# Patient Record
Sex: MALE | Race: WHITE | NOT HISPANIC OR LATINO | Employment: FULL TIME | URBAN - METROPOLITAN AREA
[De-identification: names, ages, dates, MRNs, and addresses within clinical notes are randomized per-mention and may not be internally consistent; named-entity substitution may affect disease eponyms.]

---

## 2017-08-19 ENCOUNTER — OFFICE VISIT (OUTPATIENT)
Dept: URGENT CARE | Age: 32
End: 2017-08-19
Payer: COMMERCIAL

## 2017-08-19 PROCEDURE — G0382 LEV 3 HOSP TYPE B ED VISIT: HCPCS

## 2017-08-19 PROCEDURE — S9083 URGENT CARE CENTER GLOBAL: HCPCS

## 2018-06-03 ENCOUNTER — HOSPITAL ENCOUNTER (EMERGENCY)
Facility: HOSPITAL | Age: 33
Discharge: HOME | End: 2018-06-03
Attending: EMERGENCY MEDICINE | Admitting: EMERGENCY MEDICINE
Payer: COMMERCIAL

## 2018-06-03 VITALS
DIASTOLIC BLOOD PRESSURE: 69 MMHG | SYSTOLIC BLOOD PRESSURE: 116 MMHG | TEMPERATURE: 98.3 F | OXYGEN SATURATION: 99 % | RESPIRATION RATE: 18 BRPM | HEART RATE: 66 BPM | WEIGHT: 161.82 LBS

## 2018-06-03 DIAGNOSIS — S61.219A FINGER LACERATION: Primary | ICD-10-CM

## 2018-06-03 PROCEDURE — 90715 TDAP VACCINE 7 YRS/> IM: CPT | Performed by: EMERGENCY MEDICINE

## 2018-06-03 PROCEDURE — 99282 EMERGENCY DEPT VISIT SF MDM: CPT

## 2018-06-03 PROCEDURE — 90471 IMMUNIZATION ADMIN: CPT

## 2018-06-03 RX ORDER — AZITHROMYCIN 250 MG/1
500 TABLET, FILM COATED ORAL EVERY 24 HOURS
COMMUNITY
End: 2022-03-14

## 2018-06-03 RX ORDER — OMEPRAZOLE 10 MG/1
10 CAPSULE, DELAYED RELEASE ORAL DAILY
COMMUNITY

## 2018-06-03 RX ADMIN — TETANUS TOXOID, REDUCED DIPHTHERIA TOXOID AND ACELLULAR PERTUSSIS VACCINE, ADSORBED 0.5 ML: 5; 2.5; 8; 8; 2.5 SUSPENSION INTRAMUSCULAR at 22:26

## 2018-06-04 NOTE — ED PROVIDER NOTES
History  Chief Complaint   Patient presents with    Finger Laceration     Pt presents to ED for evaluation and treatment of laceration to left second digit  States he cut his finger with a knife when cutting a baguette       History provided by:  Patient   used: No     57-year-old male presented with acute laceration of the volar aspect of his left index finger, about 8 mm long while cutting bread  Pain minimal, localized  Minimal venous oozing on arrival   Last tetanus unknown  Mild tenderness  Able to range the finger normally  No tendon involvement  Plan irrigation and closure with glue  Prior to Admission Medications   Prescriptions Last Dose Informant Patient Reported? Taking? azithromycin (ZITHROMAX) 250 mg tablet   Yes Yes   Sig: Take 500 mg by mouth every 24 hours   omeprazole (PriLOSEC) 10 mg delayed release capsule   Yes Yes   Sig: Take 10 mg by mouth daily      Facility-Administered Medications: None       Past Medical History:   Diagnosis Date    GERD (gastroesophageal reflux disease)        No past surgical history on file  No family history on file  I have reviewed and agree with the history as documented  Social History   Substance Use Topics    Smoking status: Never Smoker    Smokeless tobacco: Not on file    Alcohol use No        Review of Systems   Constitutional: Negative for activity change and appetite change  Skin: Positive for wound  Neurological: Negative for dizziness, weakness and numbness  All other systems reviewed and are negative  Physical Exam  Physical Exam   Constitutional: He is oriented to person, place, and time  He appears well-developed and well-nourished  No distress  HENT:   Head: Normocephalic and atraumatic  Neck: Normal range of motion  Neck supple  Cardiovascular: Normal rate and regular rhythm  Pulmonary/Chest: Effort normal  No respiratory distress  Musculoskeletal: Normal range of motion   He exhibits no edema  Neurological: He is alert and oriented to person, place, and time  He exhibits normal muscle tone  Skin: Skin is warm and dry  8 mm laceration on the distal volar aspect of the left index finger  Psychiatric: He has a normal mood and affect  His behavior is normal    Nursing note and vitals reviewed  Vital Signs  ED Triage Vitals [06/03/18 2045]   Temperature Pulse Respirations Blood Pressure SpO2   98 3 °F (36 8 °C) 66 18 116/69 99 %      Temp Source Heart Rate Source Patient Position - Orthostatic VS BP Location FiO2 (%)   Oral Monitor Sitting Right arm --      Pain Score       2           Vitals:    06/03/18 2045   BP: 116/69   Pulse: 66   Patient Position - Orthostatic VS: Sitting       Visual Acuity      ED Medications  Medications   tetanus-diphtheria-acellular pertussis (BOOSTRIX) IM injection 0 5 mL (0 5 mL Intramuscular Given 6/3/18 2226)       Diagnostic Studies  Results Reviewed     None                 No orders to display              Procedures  Lac Repair  Date/Time: 6/3/2018 10:29 PM  Performed by: Matt Sesay  Authorized by: Matt Sesay   Consent: Verbal consent obtained  Consent given by: patient  Patient identity confirmed: verbally with patient  Body area: upper extremity  Location details: left index finger  Laceration length: 0 8 cm    Wound Dehiscence:  Superficial Wound Dehiscence: simple closure      Procedure Details:  Irrigation solution: tap water  Skin closure: glue  Approximation: close  Approximation difficulty: simple  Patient tolerance: Patient tolerated the procedure well with no immediate complications             Phone Contacts  ED Phone Contact    ED Course                               MDM  Number of Diagnoses or Management Options  Finger laceration:   Diagnosis management comments: 77-year-old male presented with laceration on his left index finger while cutting bread    Laceration fairly superficial   Irrigated and closed with glue     Patient Progress  Patient progress: improved    CritCare Time    Disposition  Final diagnoses:   Finger laceration     Time reflects when diagnosis was documented in both MDM as applicable and the Disposition within this note     Time User Action Codes Description Comment    6/3/2018 10:23 PM Patricia Sims Add [I92 [id number]] Finger laceration       ED Disposition     ED Disposition Condition Comment    Discharge  Cuco Swartz discharge to home/self care  Condition at discharge: Good        Follow-up Information     Follow up With Specialties Details Why Contact [facility], DO   As needed [address] [place] [address]  [phone]            Discharge Medication List as of 6/3/2018 10:30 PM      CONTINUE these medications which have NOT CHANGED    Details   azithromycin (ZITHROMAX) 250 mg tablet Take 500 mg by mouth every 24 hours, Historical Med      omeprazole (PriLOSEC) 10 mg delayed release capsule Take 10 mg by mouth daily, Historical Med           No discharge procedures on file      ED Provider  Electronically Signed by           Garry Bergman MD  06/04/18 8544

## 2018-06-04 NOTE — DISCHARGE INSTRUCTIONS
Finger Laceration   WHAT YOU NEED TO KNOW:   A finger laceration is a deep cut in your skin  It is often caused by a sharp object, such as a knife, or blunt force to your finger  Your blood vessels, bones, joints, tendons, or nerves may also be injured  DISCHARGE INSTRUCTIONS:   Return to the emergency department if:   · Your wound comes apart  · Blood soaks through your bandage  · You have severe pain in your finger or hand  · Your finger is pale and cold  · You have sudden trouble moving your finger  · Your swelling suddenly gets worse  · You have red streaks on your skin coming from your wound  Contact your healthcare provider or hand specialist if:   · You have new numbness or tingling  · Your finger feels warm, looks swollen or red, and is draining pus  · You have a fever  · You have questions or concerns about your condition or care  Medicines: You may  need any of the following:  · Antibiotics  help prevent a bacterial infection  · Acetaminophen  decreases pain and fever  It is available without a doctor's order  Ask how much to take and how often to take it  Follow directions  Read the labels of all other medicines you are using to see if they also contain acetaminophen, or ask your doctor or pharmacist  Acetaminophen can cause liver damage if not taken correctly  Do not use more than 4 grams (4,000 milligrams) total of acetaminophen in one day  · Prescription pain medicine  may be given  Ask your healthcare provider how to take this medicine safely  Some prescription pain medicines contain acetaminophen  Do not take other medicines that contain acetaminophen without talking to your healthcare provider  Too much acetaminophen may cause liver damage  Prescription pain medicine may cause constipation  Ask your healthcare provider how to prevent or treat constipation  · Take your medicine as directed    Contact your healthcare provider if you think your medicine is not helping or if you have side effects  Tell him or her if you are allergic to any medicine  Keep a list of the medicines, vitamins, and herbs you take  Include the amounts, and when and why you take them  Bring the list or the pill bottles to follow-up visits  Carry your medicine list with you in case of an emergency  Self-care:   · Apply ice  on your finger for 15 to 20 minutes every hour or as directed  Use an ice pack, or put crushed ice in a plastic bag  Cover it with a towel before you apply it to your skin  Ice helps prevent tissue damage and decreases swelling and pain  · Elevate  your hand above the level of your heart as often as you can  This will help decrease swelling and pain  Prop your hand on pillows or blankets to keep it elevated comfortably  · Wear your splint as directed  A splint will decrease movement and stress on your wound  The splint may help your wound heal faster  Ask your healthcare provider how to apply and remove a splint  · Apply ointments to decrease scarring  Do not apply ointments until your healthcare provider says it is okay  You may need to wait until your wound is healed  Ask which ointment to buy and how often to use it  Wound care:   · Do not get your wound wet until your healthcare provider says it is okay  Do not soak your hand in water  Do not go swimming until your healthcare provider says it is okay  When your healthcare provider says it is okay, carefully wash around the wound with soap and water  Let soap and water run over your wound  Gently pat the area dry or allow it to air dry  · Change your bandages when they get wet, dirty, or after washing  Apply new, clean bandages as directed  Do not apply elastic bandages or tape too tightly  Do not put powders or lotions on your wound  · Apply antibiotic ointment as directed  Your healthcare provider may give you antibiotic ointment to put over your wound if you have stitches   If you have Strips-Strips over your wound, let them dry up and fall off on their own  If they do not fall off within 14 days, gently remove them  If you have glue over your wound, do not remove or pick at it  If your glue comes off, do not replace it with glue that you have at home  · Check your wound every day for signs of infection  Signs of infection include swelling, redness, or pus  Follow up with your healthcare provider or hand specialist in 2 days:  Write down your questions so you remember to ask them during your visits  © 2017 [employer] Information is for End User's use only and may not be sold, redistributed or otherwise used for commercial purposes  All illustrations and images included in CareNotes® are the copyrighted property of A [employer]  or [employer]  The above information is an  only  It is not intended as medical advice for individual conditions or treatments  Talk to your doctor, nurse or pharmacist before following any medical regimen to see if it is safe and effective for you  Skin Adhesive Care   WHAT YOU NEED TO KNOW:   Skin adhesive is medical glue used to close wounds  It is a substitute for staples and stitches  Skin adhesive wound closures take less time and do not require anesthesia  You have less pain and a lower risk of infection than with staples or stitches  Skin adhesive will fall off after the wound is healed  DISCHARGE INSTRUCTIONS:   Self-care:   · Keep your wound clean and dry  for 1 to 5 days  You can shower 24 hours after the skin adhesive is applied  Lightly pat your wound dry after you shower  · Do not soak  your wound in water, such as in a bath or hot tub  · Do not scrub  your wound or pick at the adhesive  This can make your wound reopen  · Do not apply ointments  to your wound  These include antibiotic and other ointments that contain petroleum jelly   These products will remove skin adhesive and reopen your wound  Follow up with your healthcare provider as directed:  Write down your questions so you remember to ask them during your visits  Contact your healthcare provider if:   · You have a fever  · Your wound is red and warm to touch  · You have questions or concerns about your condition or care  Return to the emergency department if:   · Your wound has fluid draining from it  · Your wound opens  © 2017 [address] Information is for End User's use only and may not be sold, redistributed or otherwise used for commercial purposes  All illustrations and images included in CareNotes® are the copyrighted property of [employer]  or [employer]  The above information is an  only  It is not intended as medical advice for individual conditions or treatments  Talk to your doctor, nurse or pharmacist before following any medical regimen to see if it is safe and effective for you

## 2019-02-07 ENCOUNTER — TRANSCRIBE ORDERS (OUTPATIENT)
Dept: ADMINISTRATIVE | Age: 34
End: 2019-02-07

## 2019-02-07 ENCOUNTER — APPOINTMENT (OUTPATIENT)
Dept: RADIOLOGY | Age: 34
End: 2019-02-07
Payer: COMMERCIAL

## 2019-02-07 DIAGNOSIS — M79.671 RIGHT FOOT PAIN: Primary | ICD-10-CM

## 2019-02-07 DIAGNOSIS — M79.671 RIGHT FOOT PAIN: ICD-10-CM

## 2019-02-07 PROCEDURE — 73630 X-RAY EXAM OF FOOT: CPT

## 2019-02-22 PROBLEM — J34.2 NASAL SEPTAL DEVIATION: Status: ACTIVE | Noted: 2019-02-22

## 2019-02-22 PROBLEM — H91.90 HEARING LOSS: Status: ACTIVE | Noted: 2019-02-22

## 2019-02-22 PROBLEM — H93.11 TINNITUS, RIGHT: Status: ACTIVE | Noted: 2019-02-22

## 2019-02-22 PROBLEM — H90.3 SENSORINEURAL HEARING LOSS (SNHL) OF BOTH EARS: Status: ACTIVE | Noted: 2019-02-22

## 2019-06-13 ENCOUNTER — OFFICE VISIT (OUTPATIENT)
Dept: INTERNAL MEDICINE CLINIC | Age: 34
End: 2019-06-13
Payer: COMMERCIAL

## 2019-06-13 VITALS
HEART RATE: 84 BPM | TEMPERATURE: 97.6 F | OXYGEN SATURATION: 99 % | HEIGHT: 70 IN | SYSTOLIC BLOOD PRESSURE: 102 MMHG | BODY MASS INDEX: 22.88 KG/M2 | DIASTOLIC BLOOD PRESSURE: 64 MMHG | WEIGHT: 159.8 LBS

## 2019-06-13 DIAGNOSIS — J02.9 SORE THROAT: Primary | ICD-10-CM

## 2019-06-13 PROCEDURE — 87070 CULTURE OTHR SPECIMN AEROBIC: CPT | Performed by: INTERNAL MEDICINE

## 2019-06-13 PROCEDURE — 99202 OFFICE O/P NEW SF 15 MIN: CPT | Performed by: INTERNAL MEDICINE

## 2019-06-13 PROCEDURE — 3008F BODY MASS INDEX DOCD: CPT | Performed by: INTERNAL MEDICINE

## 2019-06-13 RX ORDER — FLUTICASONE PROPIONATE 50 MCG
1 SPRAY, SUSPENSION (ML) NASAL DAILY
COMMUNITY

## 2019-06-13 RX ORDER — LORATADINE 10 MG/1
10 TABLET ORAL DAILY
COMMUNITY

## 2019-06-14 ENCOUNTER — TELEPHONE (OUTPATIENT)
Dept: INTERNAL MEDICINE CLINIC | Age: 34
End: 2019-06-14

## 2019-06-15 LAB — BACTERIA THROAT CULT: NORMAL

## 2020-06-30 ENCOUNTER — EVALUATION (OUTPATIENT)
Dept: PHYSICAL THERAPY | Age: 35
End: 2020-06-30
Payer: COMMERCIAL

## 2020-06-30 DIAGNOSIS — M25.512 ACUTE PAIN OF LEFT SHOULDER: Primary | ICD-10-CM

## 2020-06-30 DIAGNOSIS — M99.08 RIB CAGE DYSFUNCTION: ICD-10-CM

## 2020-06-30 PROCEDURE — 97110 THERAPEUTIC EXERCISES: CPT | Performed by: PHYSICAL THERAPIST

## 2020-06-30 PROCEDURE — 97162 PT EVAL MOD COMPLEX 30 MIN: CPT | Performed by: PHYSICAL THERAPIST

## 2020-07-03 ENCOUNTER — OFFICE VISIT (OUTPATIENT)
Dept: PHYSICAL THERAPY | Age: 35
End: 2020-07-03
Payer: COMMERCIAL

## 2020-07-03 DIAGNOSIS — M25.512 ACUTE PAIN OF LEFT SHOULDER: Primary | ICD-10-CM

## 2020-07-03 DIAGNOSIS — M99.08 RIB CAGE DYSFUNCTION: ICD-10-CM

## 2020-07-03 PROCEDURE — 97112 NEUROMUSCULAR REEDUCATION: CPT | Performed by: PHYSICAL THERAPIST

## 2020-07-03 PROCEDURE — 97140 MANUAL THERAPY 1/> REGIONS: CPT | Performed by: PHYSICAL THERAPIST

## 2020-07-07 ENCOUNTER — OFFICE VISIT (OUTPATIENT)
Dept: PHYSICAL THERAPY | Age: 35
End: 2020-07-07
Payer: COMMERCIAL

## 2020-07-07 DIAGNOSIS — M25.512 ACUTE PAIN OF LEFT SHOULDER: Primary | ICD-10-CM

## 2020-07-07 DIAGNOSIS — M99.08 RIB CAGE DYSFUNCTION: ICD-10-CM

## 2020-07-07 PROCEDURE — 97140 MANUAL THERAPY 1/> REGIONS: CPT | Performed by: PHYSICAL THERAPIST

## 2020-07-07 PROCEDURE — 97110 THERAPEUTIC EXERCISES: CPT | Performed by: PHYSICAL THERAPIST

## 2020-07-07 NOTE — PROGRESS NOTES
Daily Note     Today's date: 2020  Patient name: Eliud French  : 1985  MRN: [id number]  Referring provider: David Quan DO  Dx:   Encounter Diagnosis     ICD-10-CM    1  Acute pain of left shoulder M25 512    2  Rib cage dysfunction M99 08        Start Time: 1620  Stop Time: 1710  Total time in clinic (min): 50 minutes    Subjective: Pt reports doing slightly better overall  Symptoms improve with self MET to rib  Objective: See treatment diary below    Assessment: Tolerated treatment well  Pt reported feeling better post treatment today  Educated patient on activity modification  Improved behind head movement noted following manual intervention  Plan: Continue per plan of care        Precautions: GERD      Manuals 7/3 7/7           Cervical side glides c4-c7 JF            Shoulder abduction with ER mobs JF            4 th rib mobs   FB a/p           T-spine gr  5  FB           Subscap MFR  FB                                     Neuro Re-Ed 7/3            MET 4th rib 3*10                                                                                          Ther Ex             Abduction slides HEP            IR isometrics HEP            UBE  6'           Supine punch  3*10                                                               Ther Activity                                       Gait Training                                       Modalities

## 2020-07-09 ENCOUNTER — OFFICE VISIT (OUTPATIENT)
Dept: PHYSICAL THERAPY | Age: 35
End: 2020-07-09
Payer: COMMERCIAL

## 2020-07-09 DIAGNOSIS — M25.512 ACUTE PAIN OF LEFT SHOULDER: Primary | ICD-10-CM

## 2020-07-09 DIAGNOSIS — M99.08 RIB CAGE DYSFUNCTION: ICD-10-CM

## 2020-07-09 PROCEDURE — 97140 MANUAL THERAPY 1/> REGIONS: CPT | Performed by: PHYSICAL THERAPIST

## 2020-07-09 PROCEDURE — 97110 THERAPEUTIC EXERCISES: CPT | Performed by: PHYSICAL THERAPIST

## 2020-07-10 NOTE — PROGRESS NOTES
Daily Note     Today's date: 2020  Patient name: Nena Waters  : 1985  MRN: [id number]  Referring provider: Julianna Pelletier DO  Dx:   Encounter Diagnosis     ICD-10-CM    1  Acute pain of left shoulder M25 512    2  Rib cage dysfunction M99 08        Start Time:   Stop Time: 0  Total time in clinic (min): 45 minutes    Subjective: Pt reports improvements with symptoms  Objective: See treatment diary below      Assessment: Tolerated treatment well  Pt's POC was progressed to include more mobilizations with HEP to maintain gains made in PT  Patient demonstrated fatigue post treatment and would benefit from continued PT      Plan: Continue per plan of care  Precautions: GERD      Manuals 7/3 7/9           Cervical side glides c4-c7 JF            Shoulder abduction with ER mobs JF            4 th rib mobs   FB a/p           T-spine gr   5  JF           2 rib mobs  JF           CT junction mobs  JF                        Neuro Re-Ed 7/3 7/9           MET 4th rib 3*10 3*10                                                                                         Ther Ex            Abduction slides HEP            IR isometrics HEP            UBE  6'           Supine punch  3*10                                                               Ther Activity                                       Gait Training                                       Modalities

## 2020-07-13 ENCOUNTER — APPOINTMENT (OUTPATIENT)
Dept: PHYSICAL THERAPY | Age: 35
End: 2020-07-13
Payer: COMMERCIAL

## 2020-07-16 ENCOUNTER — OFFICE VISIT (OUTPATIENT)
Dept: PHYSICAL THERAPY | Age: 35
End: 2020-07-16
Payer: COMMERCIAL

## 2020-07-16 DIAGNOSIS — M25.512 ACUTE PAIN OF LEFT SHOULDER: Primary | ICD-10-CM

## 2020-07-16 DIAGNOSIS — M99.08 RIB CAGE DYSFUNCTION: ICD-10-CM

## 2020-07-16 PROCEDURE — 97110 THERAPEUTIC EXERCISES: CPT | Performed by: PHYSICAL THERAPIST

## 2020-07-16 PROCEDURE — 97140 MANUAL THERAPY 1/> REGIONS: CPT | Performed by: PHYSICAL THERAPIST

## 2020-07-16 PROCEDURE — 97112 NEUROMUSCULAR REEDUCATION: CPT | Performed by: PHYSICAL THERAPIST

## 2020-07-16 NOTE — PROGRESS NOTES
Daily Note     Today's date: 2020  Patient name: Justyna Cuevas  : 1985  MRN: [id number]  Referring provider: Kathleen Hernadez DO  Dx:   Encounter Diagnosis     ICD-10-CM    1  Acute pain of left shoulder M25 512    2  Rib cage dysfunction M99 08        Start Time: 870  Stop Time: 1700  Total time in clinic (min): 45 minutes    Subjective: Pt reports improvements with symptoms  Objective: See treatment diary below      Assessment: Tolerated treatment well  Pt's POC was progressed to address decreased mobility of upper quarter to increase tolerance to overhead lifting  Patient demonstrated fatigue post treatment and would benefit from continued PT      Plan: Continue per plan of care  Precautions: GERD      Manuals 7/3 7/9 7/16          Cervical side glides c4-c7 JF  JF          Shoulder abduction with ER mobs JF            4 th rib mobs   FB a/p JF          T-spine gr   5  JF JF          2 rib mobs  JF           Scapular inferior glides in sidelying   JF                       Neuro Re-Ed 7/3 7/9 7/16          MET 4th rib 3*10 3*10 3*10          Prone ITY   3*10                                                                           Ther Ex           Abduction slides HEP            IR isometrics HEP            UBE  6'           Supine punch  3*10           Doorway stretch   15*5"          Shoulder rolls   30*2                                    Ther Activity                                       Gait Training                                       Modalities

## 2020-07-20 ENCOUNTER — OFFICE VISIT (OUTPATIENT)
Dept: PHYSICAL THERAPY | Age: 35
End: 2020-07-20
Payer: COMMERCIAL

## 2020-07-20 DIAGNOSIS — M99.08 RIB CAGE DYSFUNCTION: ICD-10-CM

## 2020-07-20 DIAGNOSIS — M25.512 ACUTE PAIN OF LEFT SHOULDER: Primary | ICD-10-CM

## 2020-07-20 PROCEDURE — 97110 THERAPEUTIC EXERCISES: CPT

## 2020-07-20 PROCEDURE — 97112 NEUROMUSCULAR REEDUCATION: CPT

## 2020-07-20 NOTE — PROGRESS NOTES
Daily Note     Today's date: 2020  Patient name: Fran Trammell  : 1985  MRN: [id number]  Referring provider: Charly Pedroza DO  Dx:   Encounter Diagnosis     ICD-10-CM    1  Acute pain of left shoulder M25 512    2  Rib cage dysfunction M99 08                   Subjective:  Pt reports "good days/bad days", pt reports relief with door stretch, pt reports he was able to swim freestyle a little yesterday, hasn't been able to do that since beginning therapy, pt reports overhead activity is improving also reports he's able to WB through L UE now, pt reports he's trying to adjust his workstation at home      Objective: See treatment diary below  Pt ed: workspace ergonomics       Assessment:  Cervical/shoulder ROM seems good today, no increased pain noted except briefly while perfoming prone lower trap strengthening, pt had muscle cramping in UT, improved with manual/verbal cues for scapular stabilization, pt would benefit from cont periscapular strengthening and postural re ed, pt to adjust workstation as needed      Plan: Continue per plan of care  Progress treatment as tolerated  Precautions: GERD      Manuals 7/3 7/9 7/16 7/20/20         Cervical side glides c4-c7 JF  JF          Shoulder abduction with ER mobs JF            4 th rib mobs   FB a/p JF          T-spine gr   5  JF JF          2 rib mobs  JF           Scapular inferior glides in sidelying   JF                       Neuro Re-Ed 7/3 7/9 7/16 7/20/20         MET 4th rib 3*10 3*10 3*10          Prone ITY   3*10 3x10 ea                                                                          Ther Ex 20         Abduction slides HEP            IR isometrics HEP            UBE  6'  3'/3'         Supine punch  3*10           Doorway stretch   15*5" 5x15"         Shoulder rolls   30*2 2x30         Thoracic ext stretch sitting    3x10x5"         TB rows    gtb 3x10         TB sh ext    rtb 3x10         Sitting postural correction    HEP                      Ther Activity                                       Gait Training                                       Modalities

## 2020-07-23 ENCOUNTER — OFFICE VISIT (OUTPATIENT)
Dept: PHYSICAL THERAPY | Age: 35
End: 2020-07-23
Payer: COMMERCIAL

## 2020-07-23 DIAGNOSIS — M99.08 RIB CAGE DYSFUNCTION: ICD-10-CM

## 2020-07-23 DIAGNOSIS — M25.512 ACUTE PAIN OF LEFT SHOULDER: Primary | ICD-10-CM

## 2020-07-23 PROCEDURE — 97110 THERAPEUTIC EXERCISES: CPT | Performed by: PHYSICAL THERAPIST

## 2020-07-23 PROCEDURE — 97112 NEUROMUSCULAR REEDUCATION: CPT | Performed by: PHYSICAL THERAPIST

## 2020-07-23 PROCEDURE — 97140 MANUAL THERAPY 1/> REGIONS: CPT | Performed by: PHYSICAL THERAPIST

## 2020-07-23 NOTE — PROGRESS NOTES
Daily Note     Today's date: 2020  Patient name: Amos Prater  : 1985  MRN: [id number]  Referring provider: Kevin Dempsey DO  Dx:   Encounter Diagnosis     ICD-10-CM    1  Acute pain of left shoulder M25 512    2  Rib cage dysfunction M99 08        Start Time:   Stop Time: 1700  Total time in clinic (min): 45 minutes    Subjective: Pt reports improvements with symptoms  Objective: See treatment diary below      Assessment: Tolerated treatment well  HEP advanced to include more pec activation interventions to maintain improvements  Patient demonstrated fatigue post treatment and would benefit from continued PT      Plan: Continue per plan of care  Precautions: GERD      Manuals 7/3 7/9 7/16 7/23/20         Cervical side glides c4-c7 JF  JF JF         Shoulder abduction with ER mobs JF            4 th rib mobs   FB a/p JF JF         T-spine gr   5  JF JF          2 rib mobs  JF           Scapular inferior glides in sidelying   JF JF                      Neuro Re-Ed 7/3 7/9 7/16 7/23/20         MET 4th rib 3*10 3*10 3*10          Prone ITY   3*10 3x10 ea                                                                          Ther Ex 20         Abduction slides HEP            IR isometrics HEP            UBE  6'  3'/3'         Supine punch  3*10           Tree huggers TB   15*5" 3*10*5" rtb         Table push-ups   30*2 3*10         Thoracic ext stretch sitting    3x10x5"         TB rows    gtb 3x10         TB sh ext    rtb 3x10         Sitting postural correction    HEP                      Ther Activity                                       Gait Training                                       Modalities

## 2020-07-27 ENCOUNTER — APPOINTMENT (OUTPATIENT)
Dept: PHYSICAL THERAPY | Age: 35
End: 2020-07-27
Payer: COMMERCIAL

## 2020-07-28 ENCOUNTER — OFFICE VISIT (OUTPATIENT)
Dept: PHYSICAL THERAPY | Age: 35
End: 2020-07-28
Payer: COMMERCIAL

## 2020-07-28 DIAGNOSIS — M99.08 RIB CAGE DYSFUNCTION: ICD-10-CM

## 2020-07-28 DIAGNOSIS — M25.512 ACUTE PAIN OF LEFT SHOULDER: Primary | ICD-10-CM

## 2020-07-28 PROCEDURE — 97110 THERAPEUTIC EXERCISES: CPT | Performed by: PHYSICAL THERAPIST

## 2020-07-28 PROCEDURE — 97112 NEUROMUSCULAR REEDUCATION: CPT | Performed by: PHYSICAL THERAPIST

## 2020-07-28 PROCEDURE — 97140 MANUAL THERAPY 1/> REGIONS: CPT | Performed by: PHYSICAL THERAPIST

## 2020-07-30 NOTE — PROGRESS NOTES
Daily Note     Today's date: 2020  Patient name: Renetta Maldonado  : 1985  MRN: [id number]  Referring provider: Natividad Lee DO  Dx:   Encounter Diagnosis     ICD-10-CM    1  Acute pain of left shoulder M25 512    2  Rib cage dysfunction M99 08        Start Time: 4299  Stop Time: 1800  Total time in clinic (min): 45 minutes    Subjective: Pt reports improvements with symptoms       Objective: See treatment diary below      Assessment: Tolerated treatment well  Pt's HEP was progressed to help maintain improvements  Patient demonstrated fatigue post treatment and would benefit from continued PT      Plan: Continue per plan of care  Precautions: GERD      Manuals 7/3 7/9 7/16 7/28/20         Cervical side glides c4-c7 JF  JF JF         Shoulder abduction with ER mobs JF            4 th rib mobs   FB a/p JF JF         T-spine gr   5  JF JF          2 rib mobs  JF           Scapular inferior glides in sidelying   JF JF                      Neuro Re-Ed 7/3 7/9 7/16 7/28/20         MET 4th rib 3*10 3*10 3*10          Prone ITY   3*10 3x10 ea                                                                          Ther Ex 20         Abduction slides HEP            IR isometrics HEP            UBE  6'  3'/3'         Supine punch  3*10           Tree huggers TB   15*5" 3*10*5" rtb         Table push-ups   30*2 3*10         Thoracic ext stretch sitting    3x10x5"         TB rows    gtb 3x10         TB sh ext    rtb 3x10         Sitting postural correction    HEP                      Ther Activity                                       Gait Training                                       Modalities

## 2020-07-31 ENCOUNTER — TRANSCRIBE ORDERS (OUTPATIENT)
Dept: PHYSICAL THERAPY | Age: 35
End: 2020-07-31

## 2020-07-31 ENCOUNTER — OFFICE VISIT (OUTPATIENT)
Dept: PHYSICAL THERAPY | Age: 35
End: 2020-07-31
Payer: COMMERCIAL

## 2020-07-31 DIAGNOSIS — M99.08 RIB CAGE DYSFUNCTION: ICD-10-CM

## 2020-07-31 DIAGNOSIS — M25.512 ACUTE PAIN OF LEFT SHOULDER: Primary | ICD-10-CM

## 2020-07-31 PROCEDURE — 97140 MANUAL THERAPY 1/> REGIONS: CPT | Performed by: PHYSICAL THERAPIST

## 2020-07-31 PROCEDURE — 97110 THERAPEUTIC EXERCISES: CPT | Performed by: PHYSICAL THERAPIST

## 2020-07-31 NOTE — PROGRESS NOTES
Daily Note     Today's date: 2020  Patient name: Linette Flores  : 1985  MRN: [id number]  Referring provider: Rosaline Quan DO  Dx:   Encounter Diagnosis     ICD-10-CM    1  Acute pain of left shoulder M25 512    2  Rib cage dysfunction M99 08        Start Time: 1150  Stop Time: 1230  Total time in clinic (min): 40 minutes  Subjective: Pt reports having low level of pain today and that his pain has been inconsistent but he is able to swim freestyle more than he was able to previously  Objective: See treatment diary below  Hypomobile L ribs 2 & 3, posterior positioned    Assessment: Patient reports overall improvement  Hypomobile rib segments & L SC joint addressed with manual techniques & followed up w/ extension SNAGs targeting the L side and posterior chain strengthening  Patient demonstrated improvement post treatment  Plan: Continue per plan of care  Precautions: GERD      Manuals 7/3 7/9 7/16 7/28/20 7/31        Cervical side glides c4-c7 JF  JF JF         Shoulder abduction with ER mobs JF            4 th rib mobs   FB a/p JF JF         T-spine gr   5  JF JF          2 rib mobs  JF   p/a 2 & a/p 3 CL        Scapular inferior glides in sidelying   JF JF         SC mobs     CL        Neuro Re-Ed 7/3 7/9 7/16 7/28/20 7/31        MET 4th rib 3*10 3*10 3*10          Prone ITY   3*10 3x10 ea         Ext L SNAG     x10x3"                                                            Ther Ex 20        Abduction slides HEP            IR isometrics HEP            UBE  6'  3'/3' 3'/3'        Supine punch  3*10           Tree huggers TB   15*5" 3*10*5" rtb         Table push-ups   30*2 3*10         Thoracic ext stretch sitting    3x10x5" 3x10x5"        TB rows    gtb 3x10 Blue tb 3x10        TB sh ext    rtb 3x10         Sitting postural correction    HEP                      Ther Activity                                       Gait Training Modalities

## 2020-08-03 ENCOUNTER — OFFICE VISIT (OUTPATIENT)
Dept: PHYSICAL THERAPY | Age: 35
End: 2020-08-03
Payer: COMMERCIAL

## 2020-08-03 DIAGNOSIS — M25.512 ACUTE PAIN OF LEFT SHOULDER: Primary | ICD-10-CM

## 2020-08-03 DIAGNOSIS — M99.08 RIB CAGE DYSFUNCTION: ICD-10-CM

## 2020-08-03 PROCEDURE — 97112 NEUROMUSCULAR REEDUCATION: CPT | Performed by: PHYSICAL THERAPIST

## 2020-08-03 PROCEDURE — 97110 THERAPEUTIC EXERCISES: CPT | Performed by: PHYSICAL THERAPIST

## 2020-08-03 PROCEDURE — 97140 MANUAL THERAPY 1/> REGIONS: CPT | Performed by: PHYSICAL THERAPIST

## 2020-08-03 NOTE — PROGRESS NOTES
Daily Note     Today's date: 8/3/2020  Patient name: Estelle Gonsalves  : 1985  MRN: [id number]  Referring provider: Yomi Clark DO  Dx:   Encounter Diagnosis     ICD-10-CM    1  Acute pain of left shoulder  M25 512    2  Rib cage dysfunction  M99 08        Start Time:   Stop Time: 1630  Total time in clinic (min): 45 minutes    Subjective: Pt reports improvements with overhead symptoms  Objective: See treatment diary below      Assessment: Tolerated treatment well  Pt demonstrates 4+/5 strength in LUE and is still lack 5 degrees of shoulder elevation limiting him from completing overhead tasks  Patient demonstrated fatigue post treatment and would benefit from continued PT      Plan: Continue per plan of care  Precautions: GERD        Manuals 7/3 7/9 7/16 83         Cervical side glides c4-c7 JF  JF JF         Shoulder abduction with ER mobs JF            4 th rib mobs   FB a/p JF JF         T-spine gr   5  JF JF          2 rib mobs  JF           Scapular inferior glides in sidelying   JF JF         SC mobs             Neuro Re-Ed 7/3 7/9 7/16 8/3         MET 4th rib 3*10 3*10 3*10          Prone ITY   3*10 3x10 ea         Ext L SNAG                                                                 Ther Ex  8/3         Abduction slides HEP            IR isometrics HEP            UBE  6'  3'/3'         Supine punch  3*10           Tree huggers TB   15*5" 3*10*5" rtb         Table push-ups   30*2 3*10         Thoracic ext stretch sitting    3x10x5"         TB rows    gtb 3x10         TB sh ext    rtb 3x10         Sitting postural correction    HEP                      Ther Activity                                       Gait Training                                       Modalities

## 2020-08-06 ENCOUNTER — OFFICE VISIT (OUTPATIENT)
Dept: PHYSICAL THERAPY | Age: 35
End: 2020-08-06
Payer: COMMERCIAL

## 2020-08-06 DIAGNOSIS — M99.08 RIB CAGE DYSFUNCTION: ICD-10-CM

## 2020-08-06 DIAGNOSIS — M25.512 ACUTE PAIN OF LEFT SHOULDER: Primary | ICD-10-CM

## 2020-08-06 PROCEDURE — 97110 THERAPEUTIC EXERCISES: CPT | Performed by: PHYSICAL THERAPIST

## 2020-08-06 PROCEDURE — 97140 MANUAL THERAPY 1/> REGIONS: CPT | Performed by: PHYSICAL THERAPIST

## 2020-08-06 PROCEDURE — 97112 NEUROMUSCULAR REEDUCATION: CPT | Performed by: PHYSICAL THERAPIST

## 2020-08-07 NOTE — PROGRESS NOTES
Daily Note     Today's date: 2020  Patient name: Ludin Wu  : 1985  MRN: [id number]  Referring provider: Rosalind Acosta DO  Dx:   Encounter Diagnosis     ICD-10-CM    1  Acute pain of left shoulder  M25 512    2  Rib cage dysfunction  M99 08        Start Time: 52  Stop Time:   Total time in clinic (min): 45 minutes    Subjective: Pt reports great improvements  Objective: See treatment diary below      Assessment: Tolerated treatment well  Pt continues to demonstrates improvements shown by FOTO score  Patient demonstrated fatigue post treatment and would benefit from continued PT      Plan: Continue per plan of care  Precautions: GERD        Manuals 7/3 7/9 7/16 86         Cervical side glides c4-c7 JF  JF JF         Shoulder abduction with ER mobs JF            4 th rib mobs   FB a/p JF JF         T-spine gr   5  JF JF          2 rib mobs  JF           Scapular inferior glides in sidelying   JF JF         SC mobs             Neuro Re-Ed 7/3 7/9 7/16 8/6         MET 4th rib 3*10 3*10 3*10          Prone ITY   3*10 3x10 ea         Ext L SNAG                                                                 Ther Ex  8/6         Abduction slides HEP            IR isometrics HEP            UBE  6'  3'/3'         Supine punch  3*10           Tree huggers TB   15*5" 3*10*5" rtb         Table push-ups   30*2 3*10         Thoracic ext stretch sitting    3x10x5"         TB rows    gtb 3x10         TB sh ext    rtb 3x10         Sitting postural correction    HEP                      Ther Activity                                       Gait Training                                       Modalities

## 2020-08-10 ENCOUNTER — OFFICE VISIT (OUTPATIENT)
Dept: PHYSICAL THERAPY | Age: 35
End: 2020-08-10
Payer: COMMERCIAL

## 2020-08-10 DIAGNOSIS — M25.512 ACUTE PAIN OF LEFT SHOULDER: Primary | ICD-10-CM

## 2020-08-10 DIAGNOSIS — M99.08 RIB CAGE DYSFUNCTION: ICD-10-CM

## 2020-08-10 PROCEDURE — 97110 THERAPEUTIC EXERCISES: CPT | Performed by: PHYSICAL THERAPIST

## 2020-08-10 PROCEDURE — 97112 NEUROMUSCULAR REEDUCATION: CPT | Performed by: PHYSICAL THERAPIST

## 2020-08-10 PROCEDURE — 97140 MANUAL THERAPY 1/> REGIONS: CPT | Performed by: PHYSICAL THERAPIST

## 2020-08-10 NOTE — PROGRESS NOTES
Daily Note     Today's date: 8/10/2020  Patient name: Lashae Hopson  : 1985  MRN: [id number]  Referring provider: Jenifer Rhoades DO  Dx:   Encounter Diagnosis     ICD-10-CM    1  Acute pain of left shoulder  M25 512    2  Rib cage dysfunction  M99 08        Start Time: 9340  Stop Time: 5  Total time in clinic (min): 45 minutes    Subjective: Pt reports further improvements in symptoms  Objective: See treatment diary below      Assessment: Tolerated treatment well  Patient demonstrated fatigue post treatment and would benefit from continued PT      Plan: Continue per plan of care  D/C next visit  Precautions: GERD        Manuals 7/3 7/9 7/16 8/10         Cervical side glides c4-c7 JF  JF JF         Shoulder abduction with ER mobs JF            4 th rib mobs   FB a/p JF JF         T-spine gr   5  JF JF          2 rib mobs  JF           Scapular inferior glides in sidelying   JF JF         SC mobs             Neuro Re-Ed 7/3 7/9 7/16 8/6         MET 4th rib 3*10 3*10 3*10          Prone ITY   3*10 3x10 ea         Ext L SNAG                                                                 Ther Ex  8/6         Abduction slides HEP            IR isometrics HEP            UBE  6'  3'/3'         Supine punch  3*10           Tree huggers TB   15*5" 3*10*5" rtb         Table push-ups   30*2 3*10         Thoracic ext stretch sitting    3x10x5"         TB rows    gtb 3x10         TB sh ext    rtb 3x10         Sitting postural correction    HEP                      Ther Activity                                       Gait Training                                       Modalities

## 2020-08-13 ENCOUNTER — OFFICE VISIT (OUTPATIENT)
Dept: PHYSICAL THERAPY | Age: 35
End: 2020-08-13
Payer: COMMERCIAL

## 2020-08-13 DIAGNOSIS — M25.512 ACUTE PAIN OF LEFT SHOULDER: Primary | ICD-10-CM

## 2020-08-13 DIAGNOSIS — M99.08 RIB CAGE DYSFUNCTION: ICD-10-CM

## 2020-08-13 PROCEDURE — 97110 THERAPEUTIC EXERCISES: CPT | Performed by: PHYSICAL THERAPIST

## 2020-08-13 PROCEDURE — 97112 NEUROMUSCULAR REEDUCATION: CPT | Performed by: PHYSICAL THERAPIST

## 2020-08-13 NOTE — PROGRESS NOTES
Discharge Summary    Today's date: 2020  Patient name: Ronaldo Mota  : 1985  MRN: [id number]  Referring provider: Maxim Boogie DO  Dx:   Encounter Diagnosis     ICD-10-CM    1  Acute pain of left shoulder  M25 512    2  Rib cage dysfunction  M99 08        Start Time: 1700  Stop Time: 1800  Total time in clinic (min): 60 minutes  Subjective: Pt reports doing well and ready for discharge to [facility]  Objective: See treatment diary below  Normal pain-free ROM    Patient co-treated by Physical Therapy Student, Sherry Benjamin, under my direct supervision  Assessment: Patient demonstrated correct exercise performance with minimal cueing today  Overall he reports decreased discomfort, improved ability to perform ADLs and other activities  Patient is being discharged as his goals have been met  Plan: D/C to [facility]  Patient to contact clinic via phone PRN  Precautions: GERD    Manuals 7/3 7/9 7/16 8/10 8/13        Cervical side glides c4-c7 JF  JF JF         4 th rib mobs   FB a/p JF JF         T-spine gr  5  JF JF          2 rib mobs  JF           Scapular inferior glides in sidelying   JF JF         SC mobs             Neuro Re-Ed 7/3 7/9 7/16 8/6 8/13        MET 4th rib 3*10 3*10 3*10          Prone ITY   3*10 3x10 ea 3*10 ea  Ext L SNAG             D1/D2 pattern     3*10 GTB ea          Serratus push up     2*10, 5"                                  Ther Ex         Abduction slides HEP            IR isometrics HEP            UBE  6'  3'/3'         Supine punch  3*10           Tree huggers TB   15*5" 3*10*5" rtb 3*10*5" gtb        Table push-ups   30*2 3*10 3*10        Thoracic ext stretch sitting    3x10x5" 3x10x5"        TB rows    gtb 3x10 3*10 blue TB        TB sh ext    rtb 3x10 3*10 blue TB        Sitting postural correction    HEP                      Ther Activity                                       Gait Training                                       Modalities

## 2021-03-11 PROBLEM — R49.0 MUSCLE TENSION DYSPHONIA: Status: ACTIVE | Noted: 2021-03-11

## 2021-03-11 PROBLEM — K21.9 GASTROESOPHAGEAL REFLUX DISEASE: Status: ACTIVE | Noted: 2021-03-11

## 2021-03-11 PROBLEM — J30.89 NON-SEASONAL ALLERGIC RHINITIS: Status: ACTIVE | Noted: 2021-03-11

## 2021-03-11 PROBLEM — J38.01 PARESIS OF LEFT VOCAL FOLD: Status: ACTIVE | Noted: 2021-03-11

## 2021-03-11 PROBLEM — R49.0 DYSPHONIA: Status: ACTIVE | Noted: 2021-03-11

## 2021-03-11 PROBLEM — J38.2 VOCAL FOLD NODULES: Status: ACTIVE | Noted: 2021-03-11

## 2021-03-11 PROBLEM — J38.3 GLOTTIC INSUFFICIENCY: Status: ACTIVE | Noted: 2021-03-11

## 2021-03-11 PROBLEM — R09.81 NASAL CONGESTION: Status: ACTIVE | Noted: 2021-03-11

## 2021-03-26 PROBLEM — D3A.8: Status: ACTIVE | Noted: 2021-03-26

## 2021-04-05 DIAGNOSIS — Z23 ENCOUNTER FOR IMMUNIZATION: ICD-10-CM

## 2021-04-25 PROBLEM — H91.90 HEARING LOSS: Status: RESOLVED | Noted: 2019-02-22 | Resolved: 2021-04-25

## 2021-05-26 NOTE — PROGRESS NOTES
Daily Note     Today's date: 7/3/2020  Patient name: Efren Whitfield  : 1985  MRN: [id number]  Referring provider: Saroj Naranjo DO  Dx:   Encounter Diagnosis     ICD-10-CM    1  Acute pain of left shoulder M25 512    2  Rib cage dysfunction M99 08        Start Time: 7661  Stop Time: 1600  Total time in clinic (min): 38 minutes    Subjective: Pt reports improvements in symptoms  Objective: See treatment diary below      Assessment: Tolerated treatment well  Pt was assessed and manual techniques increased ROM improving tolerance to overhead reaching  Progress further next visit  Patient demonstrated fatigue post treatment and would benefit from continued PT      Plan: Continue per plan of care        Precautions: GERD      Manuals 7/3            Cervical side glides c4-c7 JF            Shoulder abduction with ER mobs JF            4 th rib mobs                          Neuro Re-Ed 7/3            MET 4th rib 3*10                                                                                          Ther Ex             Abduction slides HEP            IR isometrics HEP                                                                                          Ther Activity                                       Gait Training                                       Modalities [facility]

## 2021-06-28 ENCOUNTER — LAB REQUISITION (OUTPATIENT)
Dept: LAB | Facility: HOSPITAL | Age: 36
End: 2021-06-28
Payer: COMMERCIAL

## 2021-06-28 DIAGNOSIS — D3A.8 OTHER BENIGN NEUROENDOCRINE TUMORS: ICD-10-CM

## 2021-06-28 PROCEDURE — 88321 CONSLTJ&REPRT SLD PREP ELSWR: CPT | Performed by: PATHOLOGY

## 2021-06-28 PROCEDURE — 88342 IMHCHEM/IMCYTCHM 1ST ANTB: CPT | Performed by: PATHOLOGY

## 2021-06-28 PROCEDURE — 88341 IMHCHEM/IMCYTCHM EA ADD ANTB: CPT | Performed by: PATHOLOGY

## 2021-12-24 ENCOUNTER — AMB VIDEO VISIT (OUTPATIENT)
Dept: OTHER | Facility: HOSPITAL | Age: 36
End: 2021-12-24
Payer: COMMERCIAL

## 2021-12-24 DIAGNOSIS — Z20.822 EXPOSURE TO CONFIRMED CASE OF COVID-19: Primary | ICD-10-CM

## 2021-12-24 PROCEDURE — 99212 OFFICE O/P EST SF 10 MIN: CPT | Performed by: PHYSICIAN ASSISTANT

## 2021-12-24 PROCEDURE — U0005 INFEC AGEN DETEC AMPLI PROBE: HCPCS | Performed by: PHYSICIAN ASSISTANT

## 2021-12-24 PROCEDURE — U0003 INFECTIOUS AGENT DETECTION BY NUCLEIC ACID (DNA OR RNA); SEVERE ACUTE RESPIRATORY SYNDROME CORONAVIRUS 2 (SARS-COV-2) (CORONAVIRUS DISEASE [COVID-19]), AMPLIFIED PROBE TECHNIQUE, MAKING USE OF HIGH THROUGHPUT TECHNOLOGIES AS DESCRIBED BY CMS-2020-01-R: HCPCS | Performed by: PHYSICIAN ASSISTANT

## 2022-03-14 ENCOUNTER — AMB VIDEO VISIT (OUTPATIENT)
Dept: OTHER | Facility: HOSPITAL | Age: 37
End: 2022-03-14

## 2022-03-14 ENCOUNTER — OFFICE VISIT (OUTPATIENT)
Dept: URGENT CARE | Age: 37
End: 2022-03-14
Payer: COMMERCIAL

## 2022-03-14 DIAGNOSIS — R09.81 SINUS CONGESTION: ICD-10-CM

## 2022-03-14 DIAGNOSIS — R09.81 SINUS CONGESTION: Primary | ICD-10-CM

## 2022-03-14 PROCEDURE — U0003 INFECTIOUS AGENT DETECTION BY NUCLEIC ACID (DNA OR RNA); SEVERE ACUTE RESPIRATORY SYNDROME CORONAVIRUS 2 (SARS-COV-2) (CORONAVIRUS DISEASE [COVID-19]), AMPLIFIED PROBE TECHNIQUE, MAKING USE OF HIGH THROUGHPUT TECHNOLOGIES AS DESCRIBED BY CMS-2020-01-R: HCPCS

## 2022-03-14 PROCEDURE — ECARE PR SL URGENT CARE VIRTUAL VISIT: Performed by: NURSE PRACTITIONER

## 2022-03-14 PROCEDURE — U0005 INFEC AGEN DETEC AMPLI PROBE: HCPCS

## 2022-03-14 RX ORDER — AMOXICILLIN 875 MG/1
875 TABLET, COATED ORAL 2 TIMES DAILY
Qty: 20 TABLET | Refills: 0 | Status: SHIPPED | OUTPATIENT
Start: 2022-03-14 | End: 2022-03-24

## 2022-03-14 RX ORDER — FAMOTIDINE 20 MG/1
20 TABLET, FILM COATED ORAL DAILY PRN
COMMUNITY
Start: 2021-12-07

## 2022-03-14 NOTE — PATIENT INSTRUCTIONS
Will work COVID test   Discussed isolation  Will also give you an antibiotic to hold onto for the the next several days  You develop any worsening symptoms, fevers, body aches, discolored nasal discharge or symptoms of sinus infection you can start antibiotic  Restart Flonase an allergy medication  Follow up with PCP if no improvement  Go to the ER with any worsening symptoms

## 2022-03-14 NOTE — PROGRESS NOTES
Video Visit - Dolores Palomares 43 y o  male MRN: [id number]    REQUIRED DOCUMENTATION:         1  This service was provided via [facility]  2  Provider located at [address] [place] [id number]  3  [facility] provider: ISREAL Horne  4  Identify all parties in room with patient during [facility] visit:  Patient   5  After connecting through [employer], patient was identified by name and date of birth  Patient was then informed that this was a Telemedicine visit and that the exam was being conducted confidentially over secure lines  My office door was closed  No one else was in the room  Patient acknowledged consent and understanding of privacy and security of the Telemedicine visit  I informed the patient that I have reviewed their record in Epic and presented the opportunity for them to ask any questions regarding the visit today  The patient agreed to participate  This is a 57-year-old male here today for video visit  He states about 5 days ago he started to notice increasing congestion and postnasal drip  He states he felt worse over the weekend and symptoms improved  He continues to have postnasal drip and sore throat in the morning  He does have a history of seasonal allergies  He has not received his nasal sprays or allergy medication  He concerned about COVID  He did do a COVID test at home which was negative  He does know his girlfriend did have COVID 2 weeks ago he tested during that time but tested negative  He denies any chest pain or shortness of breath  No fevers body aches or chills as this time  He states symptoms do not seem as bad as a normal sinus infection  Review of Systems   Constitutional: Negative for activity change, fatigue and fever  HENT: Positive for congestion, postnasal drip, rhinorrhea and sore throat  Respiratory: Negative for cough  Cardiovascular: Negative  Gastrointestinal: Negative for diarrhea and nausea  Skin: Negative  Neurological: Negative  Psychiatric/Behavioral: Negative  Physical Exam  Diagnoses and all orders for this visit:    Sinus congestion  -     COVID Only - Collected at [facility] or [facility]; [facility]  -     amoxicillin (AMOXIL) 875 mg tablet; Take 1 tablet (875 mg total) by mouth 2 (two) times a day for 10 days      Patient Instructions   Will work COVID test   Discussed isolation  Will also give you an antibiotic to hold onto for the the next several days  You develop any worsening symptoms, fevers, body aches, discolored nasal discharge or symptoms of sinus infection you can start antibiotic  Restart Flonase an allergy medication  Follow up with PCP if no improvement  Go to the ER with any worsening symptoms  Follow up with PCP if not improved, if symptoms are worse, go to the ER

## 2022-03-14 NOTE — CARE ANYWHERE EVISITS
Visit Summary for LEATHA BHAKTA - Gender: Male - Date of Birth: [id number]  Date: [id number] - Duration: 17 minutes  Patient: Daniel BHAKTA  Provider: Samantha BACH    Patient Contact Information  Address  [address]  [facility]  [phone]    Visit Topics  Drip causing sore throat [Added By: Self - 2022-03-14]  Cold [Added By: Self - 2022-03-14]    Triage Questions   What is your current physical address in the event of a medical emergency? Answer []  Are you allergic to any medications? Answer []  Are you now or could you be pregnant? Answer []  Do you have any immune system compromise or chronic lung   disease? Answer []  Do you have any vulnerable family members in the home (infant, pregnant, cancer, elderly)? Answer []     Conversation Transcripts  [0A][0A] [Notification] You are connected with Luna Solares, Urgent Care Specialist [0A][Notification] LEATHA BHAKTA is located in [place]  [0A][Notification] LEATHA BHAKTA has shared health history  Garret Moyer  [0A]    Diagnosis  Acute upper respiratory infection, unspecified    Procedures  Value: [id number] Code: CPT-4 UNLISTED E&M SERVICE    Medications Prescribed    No prescriptions ordered    Electronically signed by: Luna Doran(NPI [id number])

## 2022-03-15 LAB — SARS-COV-2 RNA RESP QL NAA+PROBE: NEGATIVE

## 2023-08-22 ENCOUNTER — TELEPHONE (OUTPATIENT)
Dept: UROLOGY | Facility: AMBULATORY SURGERY CENTER | Age: 38
End: 2023-08-22

## 2023-08-22 NOTE — TELEPHONE ENCOUNTER
Patient calling in to make NP apt for vasectomy consult. Patient is questioning if we know if he will need ref for his [address] [facility] [employer] insurance. Please review and call patient.         CB: [phone]

## 2023-10-30 RX ORDER — SILDENAFIL 50 MG/1
50 TABLET, FILM COATED ORAL AS NEEDED
COMMUNITY
Start: 2023-06-05

## 2023-10-31 ENCOUNTER — CONSULT (OUTPATIENT)
Dept: UROLOGY | Facility: AMBULATORY SURGERY CENTER | Age: 38
End: 2023-10-31

## 2023-10-31 VITALS
HEART RATE: 64 BPM | DIASTOLIC BLOOD PRESSURE: 70 MMHG | WEIGHT: 178 LBS | OXYGEN SATURATION: 98 % | BODY MASS INDEX: 24.92 KG/M2 | SYSTOLIC BLOOD PRESSURE: 110 MMHG | HEIGHT: 71 IN

## 2023-10-31 DIAGNOSIS — Z30.2 ENCOUNTER FOR STERILIZATION: Primary | ICD-10-CM

## 2023-10-31 RX ORDER — LORAZEPAM 1 MG/1
1 TABLET ORAL
Qty: 1 TABLET | Refills: 0 | Status: SHIPPED | OUTPATIENT
Start: 2023-10-31

## 2023-10-31 NOTE — PROGRESS NOTES
10/31/2023      Chief Complaint   Patient presents with    Vasectomy     Consult. Assessment and Plan    42 y.o. male managed by new patient    1. Desire for elective sterilization  - exam today as below  - informed consent signed today  - continue contraception  - rx Ativan with  to/from on appt date  - shave scrotal/pubic hair day prior to appt date    Return for vasectomy as scheduled. History of Present Illness  Mckinley Aguilar is a 42 y.o. male here for evaluation of VASECTOMY CONSULT    History of genitourinary or groin trauma or surgery-No   Fathered children- No  Personal and/or mutual desire for permanent sterility-Yes. Partner is on same page. 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 We discussed that vasectomy is in operation performed in the office in order to provide elective sterilization. This procedure should be considered a permanent option. Although there are subspecialists who perform vasectomy reversals, these operations are not 100% successful and are often not covered by insurance meaning they can come with a large out-of-pocket cost. The patient understands this. We reviewed the procedure in depth. Risks and benefits of the procedure were discussed and reviewed. Risks described included hematoma formation, Infection, sperm granuloma, epididymitis, post-vasectomy pain syndrome, and vasectomy failure  Informed consent was obtained in the office today. The patient was prescribed a benzodiazepine to take one hour prior to the procedure to assist with his comfort. He understands that he will require transportation to and from the office that day if he is to use the benzodiazepine.        He also understands he will require semen analysis testing at 3 months post procedure to ensure full sterilization. In the interim, he will require contraception during intercourse to avoid an undesired pregnancy. Usually, patients are out of work for 2-3 days. We recommend tight fitting scrotal support following the procedure along with ice packs applied to the scrotum 15 minutes on and 15 minutes off for the first 24 hours. We discussed that we do send the patient home with short course of anti-inflammatory and/or narcotic pain medication. After this discussion, the patient agrees to proceed. We will schedule him in the near future. He agrees to take oral sedative -Ativan 1 mg one hour prior to procedure. Review of Systems   Constitutional: Negative. Negative for chills, fatigue and fever. HENT: Negative. Respiratory:  Negative for shortness of breath. Cardiovascular:  Negative for chest pain. Gastrointestinal: Negative. Negative for abdominal pain. Endocrine: Negative. Musculoskeletal: Negative. Skin: Negative. Neurological: Negative. Negative for dizziness and light-headedness. Hematological: Negative. Psychiatric/Behavioral: Negative.              AUA SYMPTOM SCORE      Flowsheet Row Most Recent Value   AUA SYMPTOM SCORE    How often have you had a sensation of not emptying your bladder completely after you finished urinating? 0 (P)     How often have you had to urinate again less than two hours after you finished urinating? 2 (P)     How often have you found you stopped and started again several times when you urinate? 0 (P)     How often have you found it difficult to postpone urination? 0 (P)     How often have you had a weak urinary stream? 0 (P)     How often have you had to push or strain to begin urination? 0 (P)     How many times did you most typically get up to urinate from the time you went to bed at night until the time you got up in the morning? 0 (P) Quality of Life: If you were to spend the rest of your life with your urinary condition just the way it is now, how would you feel about that? 0 (P)     AUA SYMPTOM SCORE 2 (P)              Vitals  Vitals:    10/31/23 1119   BP: 110/70   BP Location: Left arm   Patient Position: Sitting   Cuff Size: Large   Pulse: 64   SpO2: 98%   Weight: 80.7 kg (178 lb)   Height: 5' 11" (1.803 m)       Physical Exam    General: Well appearing, no distress, appears stated age. HEENT:  Normocephalic, atraumatic. Conjunctiva clear. Respiratory: Nonlabored respirations, no wheeze or cough  Abdomen:  Soft nontender without hernia. No suprapubic or CVA tenderness. Genitourinary: Circumcised penis, normal phallus, orthotopic patent meatus. Testes smooth descended bilaterally into the scrotum nontender with no palpable mass. Palpably normal spermatic cord and vas deferens bilaterally. Musculoskeletal:  Normal range of motion and gait without defecit. Neuro: No gross neurologic defect or abnormality. Steady unassisted gait. Speech and affect normal.  Dermatologic: skin warm, dry; no rash erythema or ecchymosis      Past History  Past Medical History:   Diagnosis Date    GERD (gastroesophageal reflux disease)      Social History     Socioeconomic History    Marital status: Single     Spouse name: None    Number of children: None    Years of education: None    Highest education level: None   Occupational History    None   Tobacco Use    Smoking status: Never    Smokeless tobacco: Never   Substance and Sexual Activity    Alcohol use: Yes     Alcohol/week: 1.0 standard drink of alcohol     Types: 1 Glasses of wine per week     Comment: once a week glass of wine.     Drug use: Never    Sexual activity: None   Other Topics Concern    None   Social History Narrative    None     Social Determinants of Health     Financial Resource Strain: Not on file   Food Insecurity: Not on file   Transportation Needs: Not on file   Physical Activity: Not on file   Stress: Not on file   Social Connections: Not on file   Intimate Partner Violence: Not on file   Housing Stability: Not on file     Social History     Tobacco Use   Smoking Status Never   Smokeless Tobacco Never     Family History   Problem Relation Age of Onset    Colon cancer Paternal Grandfather        The following portions of the patient's history were reviewed and updated as appropriate: allergies, current medications, past medical history, past social history, past surgical history and problem list.    Results  No results found for this or any previous visit (from the past 1 hour(s)). ]  No results found for: "PSA"  No results found for: "GLUCOSE", "CALCIUM", "NA", "K", "CO2", "CL", "BUN", "CREATININE"  No results found for: "WBC", "HGB", "HCT", "MCV", "PLT"

## 2023-12-04 ENCOUNTER — PROCEDURE VISIT (OUTPATIENT)
Dept: UROLOGY | Facility: AMBULATORY SURGERY CENTER | Age: 38
End: 2023-12-04
Payer: COMMERCIAL

## 2023-12-04 ENCOUNTER — NURSE TRIAGE (OUTPATIENT)
Age: 38
End: 2023-12-04

## 2023-12-04 VITALS
SYSTOLIC BLOOD PRESSURE: 102 MMHG | OXYGEN SATURATION: 99 % | WEIGHT: 179 LBS | BODY MASS INDEX: 24.97 KG/M2 | DIASTOLIC BLOOD PRESSURE: 76 MMHG | HEART RATE: 63 BPM

## 2023-12-04 DIAGNOSIS — Z30.2 ENCOUNTER FOR STERILIZATION: Primary | ICD-10-CM

## 2023-12-04 PROCEDURE — 88302 TISSUE EXAM BY PATHOLOGIST: CPT | Performed by: PATHOLOGY

## 2023-12-04 PROCEDURE — 55250 REMOVAL OF SPERM DUCT(S): CPT | Performed by: UROLOGY

## 2023-12-04 NOTE — PROGRESS NOTES
Procedure: Vasectomy   Risks, benefits and alternatives were discussed with the patient. We discussed possible complications, including infection and bleeding. The patient was premedicated with lorazepam.   The genitalia were prepped with Betadine. Sterile drape was placed. The scrotum was anesthetized with of lidocaine 2%. The skin was opened with the sharp clamp and the right vas was grasped with the ring clamp. The perivasal sheath and vessels were then dissected off bluntly and the vas was doubly clamped. A portion was excised and both ends were then fulgurated and tied with 0 silk. Seeing good hemostasis, they were returned to the scrotum and the skin was closed with a chromic stich. The skin was opened with the sharp clamp and the left vas was grasped with the ring clamp. The perivasal sheath and vessels were then dissected off bluntly and the vas was doubly clamped. A portion was excised and both ends were then fulgurated and tied with 0 silk. Seeing good hemostasis, they were returned to the scrotum and the skin was closed with a chromic stich. Antibiotic ointment was applied. The patient tolerated the procedure well. there were no complications. He understands he is not to be considered sterile until negative semen analysis is obtained postprocedure. He also understands he should rest, ice and elevate the scrotum for atleast 48 hours to avoid complication such as hematoma.

## 2023-12-04 NOTE — TELEPHONE ENCOUNTER
Answer Assessment - Initial Assessment Questions  1. REASON FOR CALL or QUESTION: "What is your reason for calling today?" or "How can I best help you?" or "What question do you have that I can help answer?"         Patient calling to go over post vasectomy protocol. Patient feels good at this time. After reading over after care instructions, had a few questions. Reviewed post vasectomy protocol and advised patient to call back if he has further questions. Protocols used:  Information Only Call - No Triage-[facility]ULT-[place]

## 2023-12-05 ENCOUNTER — NURSE TRIAGE (OUTPATIENT)
Age: 38
End: 2023-12-05

## 2023-12-05 NOTE — TELEPHONE ENCOUNTER
Answer Assessment - Initial Assessment Questions  1. REASON FOR CALL or QUESTION: "What is your reason for calling today?" or "How can I best help you?" or "What question do you have that I can help answer?"        Patient calling to discuss vasectomy post care, procedure done yesterday. Patient feels great, he is doing everything as we discussed yesterday, and following protocol for post vasectomy care. Advised patient to keep following protocol and to call with any further questions. Reassured patient he is doing everything right so far. Protocols used:  Information Only Call - No Triage-[facility]ULT-[place]

## 2023-12-08 ENCOUNTER — NURSE TRIAGE (OUTPATIENT)
Age: 38
End: 2023-12-08

## 2023-12-08 PROCEDURE — 88302 TISSUE EXAM BY PATHOLOGIST: CPT | Performed by: PATHOLOGY

## 2023-12-08 NOTE — TELEPHONE ENCOUNTER
Called and spoke with patient. Informed on AP's note. Reviewed supportive measures. Patient has not taken any pain meds and states it has been great so far.

## 2023-12-08 NOTE — TELEPHONE ENCOUNTER
Reassure patient normal to have slight swelling, likely due to increasing activity. Can stop applying neosporin. Agree with scrotal support, cool and warm compresses, limit physical activity. Reach out to office if symptoms worsen.

## 2023-12-08 NOTE — TELEPHONE ENCOUNTER
Patient had a vasectomy on 12/4/2023. Overall he is doing well. Slight swelling in testicular area. Notices he feels testicle on thigh more than he has in the past. He is just concerned of the slight swelling. Denies any nausea vomiting fevers or abdominal pain. No voiding difficulties. He feels it is because he became more active doing chores and getting in and out of car. He has not been sexually active at this juncture. He states the incision site is fine and wanted to clarify if he should still apply neosporin to area. I gave him post vasectomy care advise to elevate legs, utilize scrotal support, combination cool and warm compresses, monitor lower abdominal activity. He verbalized understanding. Please advise of any further recommendation     Answer Questions - Initial Assessment   When did this start: yesterday    Do you have a fever: No    Do you have pain: No    Have you become unable to urinate: no    Do you have a drain in place and is it draining: no    Do you have any incisions: yes with stitches in tact.     Protocols used: Urology Post-Op Problem

## 2023-12-13 ENCOUNTER — NURSE TRIAGE (OUTPATIENT)
Age: 38
End: 2023-12-13

## 2023-12-13 NOTE — TELEPHONE ENCOUNTER
Pt of Dr. Torres Koo had vasectomy on 12/4/23 calling in today with concerns of a very small amount of blood on gauze. He denies any swelling, redness or warmth. He is not having any pain or fever. Pt feels as if everything is healing really well but just wanted to check if this is of concern. Advised pt that this can be normal and to monitor for worsening symptoms. Pt is aware to call us if he notices an increase in bleeding. ER precautions reviewed with pt. Reason for Disposition   Caller has NON-URGENT question and triager unable to answer question    Answer Assessment - Initial Assessment Questions  1. SYMPTOM: "What's the main symptom you're concerned about?" (e.g., redness, pain, drainage)      Tiny bit of blood noted on gauze from incision site    3. SURGERY: "What surgery was performed?"      Vasectomy   4. DATE of SURGERY: "When was surgery performed?"       12/4/23    6. REDNESS: "Is there any redness at the incision site?" If yes, ask: "How wide across is the redness?" (Inches, centimeters)       no  7. PAIN: "Is there any pain?" If Yes, ask: "How bad is it?"  (Scale 1-10; or mild, moderate, severe)      no  8. BLEEDING: "Is there any bleeding?" If Yes, ask: "How much?" and "Where?"      Very small amount on gauze  9. DRAINAGE: "Is there any drainage from the incision site?" If yes, ask: "What color and how much?" (e.g., red, cloudy, pus; drops, teaspoon)      no  10. FEVER: "Do you have a fever?" If Yes, ask: "What is your temperature, how was it measured, and when did it start?"        no  11.  OTHER SYMPTOMS: "Do you have any other symptoms?" (e.g., shaking chills, weakness, rash elsewhere on body)        no    Protocols used: Post-Op Incision Symptoms and Questions-ADULT-[place]

## 2023-12-15 ENCOUNTER — NURSE TRIAGE (OUTPATIENT)
Age: 38
End: 2023-12-15

## 2023-12-15 NOTE — TELEPHONE ENCOUNTER
Answer Assessment - Initial Assessment Questions  1. REASON FOR CALL or QUESTION: "What is your reason for calling today?" or "How can I best help you?" or "What question do you have that I can help answer?"          Patient had vasectomy 12/4/23 and feels great. Patient called today to find out if it is safe to start taking Sildenafil (Viagra) again, as needed? Please advise    #[phone]    Protocols used:  Information Only Call - No Triage-ADULT-[place]

## 2023-12-15 NOTE — TELEPHONE ENCOUNTER
Called and spoke with patient. Informed on AP's note. Patient understands he is not sterile until he gives his semen sample and is called with the results 8 weeks post VAS.

## 2023-12-15 NOTE — TELEPHONE ENCOUNTER
yes he can resume his viagra prn for sexual activity.  must be reminded he is not sterile yet and must have contraception plan i.e. condoms at least until his confirmation semen analysis in february

## 2023-12-30 ENCOUNTER — OFFICE VISIT (OUTPATIENT)
Dept: URGENT CARE | Age: 38
End: 2023-12-30
Payer: COMMERCIAL

## 2023-12-30 VITALS
OXYGEN SATURATION: 98 % | SYSTOLIC BLOOD PRESSURE: 134 MMHG | DIASTOLIC BLOOD PRESSURE: 82 MMHG | RESPIRATION RATE: 18 BRPM | HEART RATE: 82 BPM | TEMPERATURE: 98.4 F

## 2023-12-30 DIAGNOSIS — J06.9 UPPER RESPIRATORY TRACT INFECTION, UNSPECIFIED TYPE: ICD-10-CM

## 2023-12-30 DIAGNOSIS — J06.9 UPPER RESPIRATORY TRACT INFECTION, UNSPECIFIED TYPE: Primary | ICD-10-CM

## 2023-12-30 DIAGNOSIS — B34.9 VIRAL SYNDROME: ICD-10-CM

## 2023-12-30 PROCEDURE — G0382 LEV 3 HOSP TYPE B ED VISIT: HCPCS | Performed by: NURSE PRACTITIONER

## 2023-12-30 PROCEDURE — 87636 SARSCOV2 & INF A&B AMP PRB: CPT | Performed by: NURSE PRACTITIONER

## 2023-12-30 PROCEDURE — S9083 URGENT CARE CENTER GLOBAL: HCPCS | Performed by: NURSE PRACTITIONER

## 2023-12-30 RX ORDER — BROMPHENIRAMINE MALEATE, PSEUDOEPHEDRINE HYDROCHLORIDE, AND DEXTROMETHORPHAN HYDROBROMIDE 2; 30; 10 MG/5ML; MG/5ML; MG/5ML
10 SYRUP ORAL 4 TIMES DAILY PRN
Qty: 150 ML | Refills: 0 | Status: SHIPPED | OUTPATIENT
Start: 2023-12-30

## 2023-12-30 RX ORDER — BROMPHENIRAMINE MALEATE, PSEUDOEPHEDRINE HYDROCHLORIDE, AND DEXTROMETHORPHAN HYDROBROMIDE 2; 30; 10 MG/5ML; MG/5ML; MG/5ML
10 SYRUP ORAL 4 TIMES DAILY PRN
Qty: 150 ML | Refills: 0 | Status: SHIPPED | OUTPATIENT
Start: 2023-12-30 | End: 2023-12-30 | Stop reason: SDUPTHER

## 2023-12-30 NOTE — PROGRESS NOTES
[facility]        NAME: Ke Garrison is a 38 y.o. male  : 1985    MRN: [id number]  DATE: 2023  TIME: 11:10 AM    Assessment and Plan   Upper respiratory tract infection, unspecified type [J06.9]  1. Upper respiratory tract infection, unspecified type  brompheniramine-pseudoephedrine-DM 30-2-10 MG/5ML syrup      2. Viral syndrome  Covid/Flu- Office Collect Normal            Patient Instructions     Take medication as prescribed  Flu and covid tested; results in 1-2 days  Follow up with PCP in 3-5 days.  Proceed to  ER if symptoms worsen.    Chief Complaint     Chief Complaint   Patient presents with    Sore Throat    Cold Like Symptoms    Generalized Body Aches     Patient started last night with sore throat, congestion and generalized bodyaches         History of Present Illness       HPI  Presents to clinic with complaint of sore throat, runny nose, cough and bodyaches.  Started yesterday in the evening.  Got worse overnight.  Was out with some friends yesterday, and several of them are also sick.    Review of Systems   Review of Systems   Constitutional:  Positive for appetite change and fatigue. Negative for fever.   HENT:  Positive for congestion and rhinorrhea. Negative for ear pain and sore throat.    Respiratory:  Positive for cough. Negative for chest tightness, shortness of breath and wheezing.    Gastrointestinal:  Negative for abdominal pain.   Musculoskeletal:  Positive for myalgias.         Current Medications       Current Outpatient Medications:     brompheniramine-pseudoephedrine-DM 30-2-10 MG/5ML syrup, Take 10 mL by mouth 4 (four) times a day as needed for congestion or cough, Disp: 150 mL, Rfl: 0    azelastine (ASTELIN) 0.1 % nasal spray, 1 spray into each nostril 2 (two) times a day (Patient not taking: Reported on 2023), Disp: 1 Bottle, Rfl: 11    famotidine (PEPCID) 20 mg tablet, Take 20 mg by mouth daily as needed, Disp: , Rfl:     LORazepam (ATIVAN)  1 mg tablet, Take 1 tablet (1 mg total) by mouth 30 min pre-procedure, Disp: 1 tablet, Rfl: 0    omeprazole (PriLOSEC) 10 mg delayed release capsule, Take 10 mg by mouth daily, Disp: , Rfl:     sildenafil (VIAGRA) 50 MG tablet, Take 50 mg by mouth as needed, Disp: , Rfl:     Current Allergies     Allergies as of 12/30/2023    (No Known Allergies)            The following portions of the patient's history were reviewed and updated as appropriate: allergies, current medications, past family history, past medical history, past social history, past surgical history and problem list.     Past Medical History:   Diagnosis Date    GERD (gastroesophageal reflux disease)        No past surgical history on file.    Family History   Problem Relation Age of Onset    No Known Problems Father     No Known Problems Mother     Colon cancer Paternal Grandfather          Medications have been verified.        Objective   /82 (BP Location: Right arm, Patient Position: Sitting, Cuff Size: Standard)   Pulse 82   Temp 98.4 °F (36.9 °C)   Resp 18   SpO2 98%   No LMP for male patient.       Physical Exam     Physical Exam  Constitutional:       Appearance: He is not ill-appearing or diaphoretic.   HENT:      Right Ear: Tympanic membrane normal.      Left Ear: Tympanic membrane normal.      Nose: Rhinorrhea present.      Mouth/Throat:      Pharynx: No posterior oropharyngeal erythema.   Cardiovascular:      Rate and Rhythm: Regular rhythm.      Heart sounds: Normal heart sounds.   Pulmonary:      Effort: Pulmonary effort is normal.      Breath sounds: Normal breath sounds.

## 2023-12-31 LAB
FLUAV RNA RESP QL NAA+PROBE: NEGATIVE
FLUBV RNA RESP QL NAA+PROBE: NEGATIVE
SARS-COV-2 RNA RESP QL NAA+PROBE: POSITIVE

## 2024-01-15 ENCOUNTER — APPOINTMENT (OUTPATIENT)
Dept: LAB | Facility: CLINIC | Age: 39
End: 2024-01-15
Payer: COMMERCIAL

## 2024-01-15 DIAGNOSIS — Z30.2 ENCOUNTER FOR STERILIZATION: ICD-10-CM

## 2024-01-15 LAB
DEPRECATED CD4 CELLS/CD8 CELLS BLD: 1 ML
SPERM MOTILE SMN QL MICRO: NORMAL

## 2024-01-15 PROCEDURE — 89321 SEMEN ANAL SPERM DETECTION: CPT

## 2024-01-16 ENCOUNTER — TELEPHONE (OUTPATIENT)
Age: 39
End: 2024-01-16

## 2024-01-16 ENCOUNTER — TELEPHONE (OUTPATIENT)
Dept: UROLOGY | Facility: AMBULATORY SURGERY CENTER | Age: 39
End: 2024-01-16

## 2024-01-16 NOTE — TELEPHONE ENCOUNTER
Spoke to patient who returned call from clinical staff regarding negative semen analysis results per MD review. Patient was thankful for the call.

## 2024-01-26 DIAGNOSIS — Z00.6 ENCOUNTER FOR EXAMINATION FOR NORMAL COMPARISON OR CONTROL IN CLINICAL RESEARCH PROGRAM: ICD-10-CM

## 2024-06-17 ENCOUNTER — AMB VIDEO VISIT (OUTPATIENT)
Dept: OTHER | Facility: HOSPITAL | Age: 39
End: 2024-06-17

## 2024-11-20 ENCOUNTER — OFFICE VISIT (OUTPATIENT)
Dept: URGENT CARE | Age: 39
End: 2024-11-20
Payer: COMMERCIAL

## 2024-11-20 VITALS
HEIGHT: 71 IN | HEART RATE: 80 BPM | DIASTOLIC BLOOD PRESSURE: 78 MMHG | BODY MASS INDEX: 26.88 KG/M2 | RESPIRATION RATE: 18 BRPM | SYSTOLIC BLOOD PRESSURE: 124 MMHG | TEMPERATURE: 96.7 F | OXYGEN SATURATION: 97 % | WEIGHT: 192 LBS

## 2024-11-20 DIAGNOSIS — H69.92 EUSTACHIAN TUBE DYSFUNCTION, LEFT: Primary | ICD-10-CM

## 2024-11-20 PROCEDURE — G0380 LEV 1 HOSP TYPE B ED VISIT: HCPCS

## 2024-11-20 PROCEDURE — S9083 URGENT CARE CENTER GLOBAL: HCPCS

## 2024-11-20 RX ORDER — FLUTICASONE PROPIONATE 50 MCG
1 SPRAY, SUSPENSION (ML) NASAL DAILY
Qty: 11.1 ML | Refills: 0 | Status: SHIPPED | OUTPATIENT
Start: 2024-11-20

## 2024-11-20 NOTE — PROGRESS NOTES
[facility]        NAME: Ke Garrison is a 39 y.o. male  : 1985    MRN: [id number]  DATE: 2024  TIME: 4:07 PM    Assessment and Plan   Eustachian tube dysfunction, left [H69.92]  1. Eustachian tube dysfunction, left  fluticasone (FLONASE) 50 mcg/act nasal spray            Patient Instructions     Recommend Flonase daily.  May trial Claritin or Zyrtec daily.   Follow up with PCP in 3-5 days.  Proceed to  ER if symptoms worsen.    If tests are performed, our office will contact you with results only if changes need to made to the care plan discussed with you at the visit. You can review your full results on [facility].    Chief Complaint     Chief Complaint   Patient presents with    Earache     Pt states he is still suffering from sinus infection and left ear pain after being on augmentin for 9 days.          History of Present Illness       39-year-old male presenting for evaluation of left ear pain.  Patient states he has been taking Augmentin over the past 9 days.  Sinusitis.  He notes that he is experiencing continued congestion and sinus pressure as well as left-sided ear pain.  He denies any decreased hearing or drainage.  He denies any alleviating factors of his symptoms.    Earache   Pertinent negatives include no diarrhea or vomiting.       Review of Systems   Review of Systems   Constitutional:  Negative for chills and fever.   HENT:  Positive for congestion, ear pain and sinus pressure.    Respiratory:  Negative for shortness of breath.    Cardiovascular:  Negative for chest pain.   Gastrointestinal:  Negative for diarrhea, nausea and vomiting.   All other systems reviewed and are negative.        Current Medications       Current Outpatient Medications:     famotidine (PEPCID) 20 mg tablet, Take 20 mg by mouth daily as needed, Disp: , Rfl:     fluticasone (FLONASE) 50 mcg/act nasal spray, 1 spray into each nostril daily, Disp: 11.1 mL, Rfl: 0    sildenafil  "(VIAGRA) 50 MG tablet, Take 50 mg by mouth as needed, Disp: , Rfl:     azelastine (ASTELIN) 0.1 % nasal spray, 1 spray into each nostril 2 (two) times a day (Patient not taking: Reported on 12/4/2023), Disp: 1 Bottle, Rfl: 11    brompheniramine-pseudoephedrine-DM 30-2-10 MG/5ML syrup, Take 10 mL by mouth 4 (four) times a day as needed for congestion or cough, Disp: 150 mL, Rfl: 0    LORazepam (ATIVAN) 1 mg tablet, Take 1 tablet (1 mg total) by mouth 30 min pre-procedure, Disp: 1 tablet, Rfl: 0    omeprazole (PriLOSEC) 10 mg delayed release capsule, Take 20 mg by mouth daily, Disp: , Rfl:     Current Allergies     Allergies as of 11/20/2024    (No Known Allergies)            The following portions of the patient's history were reviewed and updated as appropriate: allergies, current medications, past family history, past medical history, past social history, past surgical history and problem list.     Past Medical History:   Diagnosis Date    GERD (gastroesophageal reflux disease)        No past surgical history on file.    Family History   Problem Relation Age of Onset    No Known Problems Father     No Known Problems Mother     Colon cancer Paternal Grandfather          Medications have been verified.        Objective   /78   Pulse 80   Temp (!) 96.7 °F (35.9 °C)   Resp 18   Ht 5' 11\" (1.803 m)   Wt 87.1 kg (192 lb)   SpO2 97%   BMI 26.78 kg/m²        Physical Exam     Physical Exam  Vitals and nursing note reviewed.   Constitutional:       General: He is not in acute distress.     Appearance: Normal appearance. He is normal weight. He is not ill-appearing, toxic-appearing or diaphoretic.   HENT:      Head: Normocephalic and atraumatic.      Right Ear: Tympanic membrane normal.      Left Ear: Tympanic membrane normal.      Ears:      Comments: Small amount of serous fluid posterior TM left side        Nose: Congestion present. No rhinorrhea.      Mouth/Throat:      Mouth: Mucous membranes are moist.     "  Pharynx: Oropharynx is clear. No oropharyngeal exudate or posterior oropharyngeal erythema.   Eyes:      General:         Right eye: No discharge.         Left eye: No discharge.   Cardiovascular:      Rate and Rhythm: Normal rate and regular rhythm.      Pulses: Normal pulses.      Heart sounds: Normal heart sounds. No murmur heard.     No friction rub. No gallop.   Pulmonary:      Effort: Pulmonary effort is normal. No respiratory distress.      Breath sounds: Normal breath sounds. No stridor. No wheezing, rhonchi or rales.   Chest:      Chest wall: No tenderness.   Abdominal:      General: Bowel sounds are normal.      Palpations: Abdomen is soft.      Tenderness: There is no abdominal tenderness.   Skin:     General: Skin is warm and dry.   Neurological:      Mental Status: He is alert.   Psychiatric:         Mood and Affect: Mood normal.         Behavior: Behavior normal.

## 2024-11-20 NOTE — PATIENT INSTRUCTIONS
Recommend Flonase daily.  May trial Claritin or Zyrtec daily.   Follow up with PCP in 3-5 days.  Proceed to  ER if symptoms worsen.    If tests are performed, our office will contact you with results only if changes need to made to the care plan discussed with you at the visit. You can review your full results on [facility].

## 2024-12-06 ENCOUNTER — OFFICE VISIT (OUTPATIENT)
Dept: GASTROENTEROLOGY | Facility: CLINIC | Age: 39
End: 2024-12-06
Payer: COMMERCIAL

## 2024-12-06 VITALS
WEIGHT: 194 LBS | HEIGHT: 71 IN | BODY MASS INDEX: 27.16 KG/M2 | SYSTOLIC BLOOD PRESSURE: 122 MMHG | DIASTOLIC BLOOD PRESSURE: 70 MMHG | TEMPERATURE: 97.2 F

## 2024-12-06 DIAGNOSIS — R49.0 HOARSENESS: ICD-10-CM

## 2024-12-06 DIAGNOSIS — K27.9 PUD (PEPTIC ULCER DISEASE): ICD-10-CM

## 2024-12-06 DIAGNOSIS — R49.0 DYSPHONIA: ICD-10-CM

## 2024-12-06 DIAGNOSIS — K21.9 GASTROESOPHAGEAL REFLUX DISEASE, UNSPECIFIED WHETHER ESOPHAGITIS PRESENT: Primary | ICD-10-CM

## 2024-12-06 PROCEDURE — 99244 OFF/OP CNSLTJ NEW/EST MOD 40: CPT | Performed by: INTERNAL MEDICINE

## 2024-12-06 NOTE — PATIENT INSTRUCTIONS
Scheduled date of EGD/Bravo (as of today): 01/13/2025  Physician performing EGD: Dr. Lucia  Location of EGD:   Instructions reviewed with patient by: Brittnay HENDERSON   Clearances:  N/A

## 2024-12-08 NOTE — ASSESSMENT & PLAN NOTE
We can rule out H. pylori but peptic ulcer disease was in setting of NSAID use but this was several years ago unclear if NSAID use at that time was excessive  Continue to avoid NSAIDs

## 2024-12-08 NOTE — ASSESSMENT & PLAN NOTE
Continue current regimen as this has effective improve the quality of his life and his career  Will plan for EGD with Bravo to get baseline information regarding frequency of acid reflux and to quantify  Continue to follow with ENT

## 2024-12-08 NOTE — PROGRESS NOTES
Name: Ke Garrison      : 1985      MRN: [id number]  Encounter Provider: Joe Lucia MD  Encounter Date: 2024   Encounter department: [facility] [place]  :  Assessment & Plan  Gastroesophageal reflux disease, unspecified whether esophagitis present    Orders:    EGD; Future    Bravo pH Monitoring (must also order EGD separate); Future    PUD (peptic ulcer disease)       We can rule out H. pylori but peptic ulcer disease was in setting of NSAID use but this was several years ago unclear if NSAID use at that time was excessive  Continue to avoid NSAIDs  Hoarseness    Orders:    EGD; Future    Bravo pH Monitoring (must also order EGD separate); Future    Dysphonia       Continue current regimen as this has effective improve the quality of his life and his career  Will plan for EGD with Bravo to get baseline information regarding frequency of acid reflux and to quantify  Continue to follow with ENT        History of Present Illness   HPI  Ke Garrison is a 39 y.o. male who presents to establish care for history of acid reflux disease.  Patient is a professional gayle and musician.  Of note patient is brother of one of her .    He is overall pleasant and has been suffering with history of gastric disease for most of his lifetime since he was a teenager.  He has had multiple endoscopy in the past.  Previous endoscopy demonstrated peptic ulcer disease in setting of NSAID use.  Otherwise she has had longstanding history of acid reflux disease.  He also follows up with ENT Dr. Brasher.  He was started on PPI therapy and H2 blocker last year  with significant improvement of dysphonia and hoarseness.  This regimen has resulted in complete resolution of symptoms.    He also underwent colonoscopy for family history of colon polyps in .  At this time colonoscopy was within normal limits.  He currently has no alarm symptoms.  He was following at   "[facility] recommended EGD with Bravo.  this was not available to Dr. [place] so he presents here for this procedure and to establish care.    He does have 1 brother's family history of colon polyps otherwise he is doing well overall.      Review of Systems   All other systems reviewed and are negative.         Objective   /70 (BP Location: Right arm, Patient Position: Sitting, Cuff Size: Standard)   Temp (!) 97.2 °F (36.2 °C) (Tympanic Core)   Ht 5' 11\" (1.803 m)   Wt 88 kg (194 lb)   BMI 27.06 kg/m²      Physical Exam  HENT:      Head: Normocephalic and atraumatic.   Cardiovascular:      Rate and Rhythm: Normal rate and regular rhythm.   Pulmonary:      Effort: Pulmonary effort is normal.      Breath sounds: Normal breath sounds.   Abdominal:      General: Bowel sounds are normal. There is no distension.      Palpations: Abdomen is soft.      Tenderness: There is no abdominal tenderness. There is no rebound.   Musculoskeletal:      Cervical back: Normal range of motion.   Skin:     General: Skin is warm.   Neurological:      Mental Status: He is oriented to person, place, and time.           "

## 2024-12-17 DIAGNOSIS — K21.9 GASTROESOPHAGEAL REFLUX DISEASE, UNSPECIFIED WHETHER ESOPHAGITIS PRESENT: Primary | ICD-10-CM

## 2025-01-01 DIAGNOSIS — H69.92 EUSTACHIAN TUBE DYSFUNCTION, LEFT: ICD-10-CM

## 2025-01-02 RX ORDER — FLUTICASONE PROPIONATE 50 MCG
SPRAY, SUSPENSION (ML) NASAL
Qty: 16 ML | Refills: 0 | Status: SHIPPED | OUTPATIENT
Start: 2025-01-02

## 2025-01-13 ENCOUNTER — ANESTHESIA (OUTPATIENT)
Dept: GASTROENTEROLOGY | Facility: HOSPITAL | Age: 40
End: 2025-01-13
Payer: COMMERCIAL

## 2025-01-13 ENCOUNTER — HOSPITAL ENCOUNTER (OUTPATIENT)
Dept: GASTROENTEROLOGY | Facility: HOSPITAL | Age: 40
Setting detail: OUTPATIENT SURGERY
Discharge: HOME | End: 2025-01-13
Attending: INTERNAL MEDICINE
Payer: COMMERCIAL

## 2025-01-13 ENCOUNTER — ANESTHESIA EVENT (OUTPATIENT)
Dept: GASTROENTEROLOGY | Facility: HOSPITAL | Age: 40
End: 2025-01-13
Payer: COMMERCIAL

## 2025-01-13 VITALS
RESPIRATION RATE: 16 BRPM | SYSTOLIC BLOOD PRESSURE: 111 MMHG | BODY MASS INDEX: 27.16 KG/M2 | DIASTOLIC BLOOD PRESSURE: 69 MMHG | HEART RATE: 68 BPM | HEIGHT: 71 IN | WEIGHT: 194 LBS | OXYGEN SATURATION: 97 % | TEMPERATURE: 98.8 F

## 2025-01-13 DIAGNOSIS — R49.0 HOARSENESS: ICD-10-CM

## 2025-01-13 DIAGNOSIS — K21.9 GASTROESOPHAGEAL REFLUX DISEASE, UNSPECIFIED WHETHER ESOPHAGITIS PRESENT: ICD-10-CM

## 2025-01-13 PROCEDURE — 88305 TISSUE EXAM BY PATHOLOGIST: CPT | Performed by: STUDENT IN AN ORGANIZED HEALTH CARE EDUCATION/TRAINING PROGRAM

## 2025-01-13 PROCEDURE — 88342 IMHCHEM/IMCYTCHM 1ST ANTB: CPT | Performed by: STUDENT IN AN ORGANIZED HEALTH CARE EDUCATION/TRAINING PROGRAM

## 2025-01-13 PROCEDURE — 43239 EGD BIOPSY SINGLE/MULTIPLE: CPT | Performed by: INTERNAL MEDICINE

## 2025-01-13 RX ORDER — LIDOCAINE HYDROCHLORIDE 10 MG/ML
INJECTION, SOLUTION EPIDURAL; INFILTRATION; INTRACAUDAL; PERINEURAL AS NEEDED
Status: DISCONTINUED | OUTPATIENT
Start: 2025-01-13 | End: 2025-01-13

## 2025-01-13 RX ORDER — SODIUM CHLORIDE, SODIUM LACTATE, POTASSIUM CHLORIDE, CALCIUM CHLORIDE 600; 310; 30; 20 MG/100ML; MG/100ML; MG/100ML; MG/100ML
50 INJECTION, SOLUTION INTRAVENOUS CONTINUOUS
Status: CANCELLED | OUTPATIENT
Start: 2025-01-13

## 2025-01-13 RX ORDER — PROPOFOL 10 MG/ML
INJECTION, EMULSION INTRAVENOUS AS NEEDED
Status: DISCONTINUED | OUTPATIENT
Start: 2025-01-13 | End: 2025-01-13

## 2025-01-13 RX ORDER — SODIUM CHLORIDE, SODIUM LACTATE, POTASSIUM CHLORIDE, CALCIUM CHLORIDE 600; 310; 30; 20 MG/100ML; MG/100ML; MG/100ML; MG/100ML
INJECTION, SOLUTION INTRAVENOUS CONTINUOUS PRN
Status: DISCONTINUED | OUTPATIENT
Start: 2025-01-13 | End: 2025-01-13

## 2025-01-13 RX ADMIN — PROPOFOL 50 MG: 10 INJECTION, EMULSION INTRAVENOUS at 10:08

## 2025-01-13 RX ADMIN — PROPOFOL 30 MG: 10 INJECTION, EMULSION INTRAVENOUS at 10:12

## 2025-01-13 RX ADMIN — LIDOCAINE HYDROCHLORIDE 50 MG: 10 INJECTION, SOLUTION EPIDURAL; INFILTRATION; INTRACAUDAL; PERINEURAL at 10:05

## 2025-01-13 RX ADMIN — PROPOFOL 20 MG: 10 INJECTION, EMULSION INTRAVENOUS at 10:06

## 2025-01-13 RX ADMIN — PROPOFOL 130 MG: 10 INJECTION, EMULSION INTRAVENOUS at 10:05

## 2025-01-13 RX ADMIN — SODIUM CHLORIDE, SODIUM LACTATE, POTASSIUM CHLORIDE, AND CALCIUM CHLORIDE: .6; .31; .03; .02 INJECTION, SOLUTION INTRAVENOUS at 09:45

## 2025-01-13 RX ADMIN — PROPOFOL 40 MG: 10 INJECTION, EMULSION INTRAVENOUS at 10:19

## 2025-01-13 RX ADMIN — PROPOFOL 40 MG: 10 INJECTION, EMULSION INTRAVENOUS at 10:15

## 2025-01-13 RX ADMIN — PROPOFOL 40 MG: 10 INJECTION, EMULSION INTRAVENOUS at 10:17

## 2025-01-13 NOTE — H&P
H&P - Gastroenterology   Name: Ke Garrison 39 y.o. male I MRN: [id number]  Unit/Bed#:  I Date of Admission: 1/13/2025   Date of Service: 1/13/2025 I Hospital Day: 0     Assessment & Plan   This is a 39 y.o. year old male here for EGD with Bravo placement, and he is stable and optimized for his procedure.    History of Present Illness    Ke Garrison is a 39 y.o. year old male who presents for history of GERD.     REVIEW OF SYSTEMS: Per the HPI, and otherwise unremarkable.    Historical Information   Past Medical History:   Diagnosis Date    GERD (gastroesophageal reflux disease)      History reviewed. No pertinent surgical history.  Social History     Tobacco Use    Smoking status: Never    Smokeless tobacco: Never   Substance and Sexual Activity    Alcohol use: Yes     Alcohol/week: 1.0 standard drink of alcohol     Types: 1 Glasses of wine per week     Comment: once a week glass of wine.    Drug use: Never    Sexual activity: Yes     Partners: Female     Birth control/protection: Male Sterilization     E-Cigarette/Vaping     E-Cigarette/Vaping Substances     Meds/Allergies     Current Outpatient Medications:     famotidine (PEPCID) 20 mg tablet    omeprazole (PriLOSEC) 20 mg delayed release capsule    azelastine (ASTELIN) 0.1 % nasal spray    brompheniramine-pseudoephedrine-DM 30-2-10 MG/5ML syrup    fluticasone (FLONASE) 50 mcg/act nasal spray    LORazepam (ATIVAN) 1 mg tablet    omeprazole (PriLOSEC) 10 mg delayed release capsule    sildenafil (VIAGRA) 50 MG tablet  No Known Allergies    Objective :       Physical Exam  Gen: NAD  Head: NCAT  CV: RRR  CHEST: Clear  ABD: soft, NT/ND  EXT: no edema

## 2025-01-13 NOTE — ANESTHESIA PREPROCEDURE EVALUATION
Procedure:  EGD  BRAVO PH MONITORING    Relevant Problems   ANESTHESIA (within normal limits)      CARDIO (within normal limits)      ENDO (within normal limits)      GI/HEPATIC   (+) Benign neuroendocrine neoplasm of rectum   (+) Gastroesophageal reflux disease   (+) PUD (peptic ulcer disease)      /RENAL (within normal limits)      HEMATOLOGY (within normal limits)      MUSCULOSKELETAL (within normal limits)      NEURO/PSYCH (within normal limits)      PULMONARY (within normal limits)        Physical Exam    Airway    Mallampati score: III  TM Distance: >3 FB  Neck ROM: full     Dental   No notable dental hx     Cardiovascular  Rhythm: regular, Rate: normal, Cardiovascular exam normal    Pulmonary  Pulmonary exam normal Breath sounds clear to auscultation    Other Findings  post-pubertal.      Anesthesia Plan  ASA Score- 2     Anesthesia Type- IV sedation with anesthesia with ASA Monitors.         Additional Monitors:     Airway Plan:            Plan Factors-Exercise tolerance (METS): >4 METS.    Chart reviewed. EKG reviewed. Imaging results reviewed. Existing labs reviewed. Patient summary reviewed.    Patient is not a current smoker.  Patient instructed to abstain from smoking on day of procedure. Patient did not smoke on day of surgery.    Obstructive sleep apnea risk education given perioperatively.        Induction- intravenous.    Postoperative Plan-     Perioperative Resuscitation Plan - Level 1 - Full Code.       Informed Consent- Anesthetic plan and risks discussed with patient.  I personally reviewed this patient with the CRNA. Discussed and agreed on the Anesthesia Plan with the CRNA..

## 2025-01-13 NOTE — ANESTHESIA POSTPROCEDURE EVALUATION
Post-Op Assessment Note    CV Status:  Stable  Pain Score: 0    Pain management: adequate       Mental Status:  Sleepy   Hydration Status:  Euvolemic   PONV Controlled:  Controlled   Airway Patency:  Patent     Post Op Vitals Reviewed: Yes    No anethesia notable event occurred.    Staff: CRNA           Last Filed PACU Vitals:  Vitals Value Taken Time   Temp 98.8 °F (37.1 °C) 01/13/25 1026   Pulse 67 01/13/25 1026   /65 01/13/25 1026   Resp 16 01/13/25 1026   SpO2 95 % 01/13/25 1026       Modified Gayle:     Vitals Value Taken Time   Activity 0 01/13/25 1026   Respiration 2 01/13/25 1026   Circulation 2 01/13/25 1026   Consciousness 0 01/13/25 1026   Oxygen Saturation 2 01/13/25 1026     Modified Gayle Score: 6

## 2025-01-16 PROCEDURE — 88305 TISSUE EXAM BY PATHOLOGIST: CPT | Performed by: STUDENT IN AN ORGANIZED HEALTH CARE EDUCATION/TRAINING PROGRAM

## 2025-01-16 PROCEDURE — 88342 IMHCHEM/IMCYTCHM 1ST ANTB: CPT | Performed by: STUDENT IN AN ORGANIZED HEALTH CARE EDUCATION/TRAINING PROGRAM

## 2025-01-18 ENCOUNTER — RESULTS FOLLOW-UP (OUTPATIENT)
Dept: GASTROENTEROLOGY | Facility: CLINIC | Age: 40
End: 2025-01-18

## 2025-01-21 PROCEDURE — 91035 G-ESOPH REFLX TST W/ELECTROD: CPT | Performed by: INTERNAL MEDICINE

## 2025-06-25 ENCOUNTER — TRANSCRIBE ORDERS (OUTPATIENT)
Dept: GASTROENTEROLOGY | Facility: CLINIC | Age: 40
End: 2025-06-25